# Patient Record
Sex: FEMALE | URBAN - METROPOLITAN AREA
[De-identification: names, ages, dates, MRNs, and addresses within clinical notes are randomized per-mention and may not be internally consistent; named-entity substitution may affect disease eponyms.]

---

## 2017-09-05 ENCOUNTER — APPOINTMENT (RX ONLY)
Dept: URBAN - METROPOLITAN AREA CLINIC 15 | Facility: CLINIC | Age: 57
Setting detail: DERMATOLOGY
End: 2017-09-05

## 2017-09-05 DIAGNOSIS — Z41.9 ENCOUNTER FOR PROCEDURE FOR PURPOSES OTHER THAN REMEDYING HEALTH STATE, UNSPECIFIED: ICD-10-CM

## 2017-09-05 PROBLEM — E78.5 HYPERLIPIDEMIA, UNSPECIFIED: Status: ACTIVE | Noted: 2017-09-05

## 2017-09-05 PROBLEM — E05.90 THYROTOXICOSIS, UNSPECIFIED WITHOUT THYROTOXIC CRISIS OR STORM: Status: ACTIVE | Noted: 2017-09-05

## 2017-09-05 PROCEDURE — ? ADDITIONAL NOTES

## 2017-09-05 NOTE — PROCEDURE: ADDITIONAL NOTES
Additional Notes: QUOTES\\n\\n1- Scar on the left cheek\\nPlan: ICON XD \\n\\n2- Rosacea\\nPlan: IPL green (4 sessions every 4-6 weeks) $350 each session \\nSPF 50 every day  \\n\\n3- Milia on the face\\nPlan: Facial extraction ( discussed with the patient, many of them will not respond because will be calcification)\\nTreatment: Smoothing Retinol 2x ( change to 5 x in 2-3 months)\\n\\n4- Melasma \\nTreatment: advance skin brigthening 6% every other night for 2 weeks and then every night x 3 months\\n\\n5- Poikiloderma on the neck\\nPlan: IPL on the neck\\n\\n6- Fillers plan:\\n1 voluma $1000 the second one $900\\n1 Radiesse $750\\n1 juvederm ultra plus $700\\n1 juvederm ultra $600\\n\\n7- Botox on the perioral area $200 and Fraxel laser on the perioral area $400 or Fraxel full face $1000 \\n\\n8- Gentle max pro for the lentigies on the hands $300

## 2017-10-17 ENCOUNTER — APPOINTMENT (RX ONLY)
Dept: URBAN - METROPOLITAN AREA CLINIC 15 | Facility: CLINIC | Age: 57
Setting detail: DERMATOLOGY
End: 2017-10-17

## 2017-10-17 DIAGNOSIS — Z41.9 ENCOUNTER FOR PROCEDURE FOR PURPOSES OTHER THAN REMEDYING HEALTH STATE, UNSPECIFIED: ICD-10-CM

## 2017-10-17 PROCEDURE — ? IPL COSMETIC

## 2017-10-17 ASSESSMENT — LOCATION DETAILED DESCRIPTION DERM
LOCATION DETAILED: NASAL DORSUM
LOCATION DETAILED: RIGHT MEDIAL ZYGOMA
LOCATION DETAILED: LEFT FOREHEAD
LOCATION DETAILED: RIGHT INFERIOR CENTRAL MALAR CHEEK
LOCATION DETAILED: RIGHT INFERIOR MEDIAL MALAR CHEEK
LOCATION DETAILED: LEFT SUPERIOR CENTRAL MALAR CHEEK
LOCATION DETAILED: INFERIOR MID FOREHEAD
LOCATION DETAILED: LEFT CENTRAL MALAR CHEEK
LOCATION DETAILED: RIGHT CENTRAL BUCCAL CHEEK
LOCATION DETAILED: RIGHT INFERIOR CENTRAL BUCCAL CHEEK
LOCATION DETAILED: LEFT INFERIOR CENTRAL MALAR CHEEK
LOCATION DETAILED: RIGHT MENTAL CREASE
LOCATION DETAILED: LEFT CENTRAL BUCCAL CHEEK
LOCATION DETAILED: LEFT INFERIOR MEDIAL MALAR CHEEK
LOCATION DETAILED: RIGHT CENTRAL MALAR CHEEK

## 2017-10-17 ASSESSMENT — LOCATION ZONE DERM
LOCATION ZONE: NOSE
LOCATION ZONE: FACE

## 2017-10-17 ASSESSMENT — LOCATION SIMPLE DESCRIPTION DERM
LOCATION SIMPLE: LEFT FOREHEAD
LOCATION SIMPLE: RIGHT CHEEK
LOCATION SIMPLE: RIGHT ZYGOMA
LOCATION SIMPLE: CHIN
LOCATION SIMPLE: INFERIOR FOREHEAD
LOCATION SIMPLE: LEFT CHEEK
LOCATION SIMPLE: NOSE

## 2017-10-17 NOTE — PROCEDURE: IPL COSMETIC
Ipl Type: Yung [place]
Treatment Number: 1
Beam Overlap/Density (Include Units): 20%
Detail Level: Zone
Pulse Energy (Include Units): 20/32 then 10/32
Indication: Rosacea
Total Pulses: 92
Coolin
Number Of Passes: 2
Price (Use Numbers Only, No Special Characters Or $): 350.00
Eye Protection: Laser Aid
Post-Care Instructions: I reviewed with the patient in detail post-care instructions. Patient should stay away from the sun and wear sun protection until treated areas are fully healed.
Pre-Procedure Care: Prior to the procedure the patient and all present had protective eyewear in place and a warning sign was placed on the door.
End-Point And Post-Procedure Care: The procedure continued until mild purpura was noted. Immediately following the procedure, Vaseline and ice applied. Post care reviewed with patient.
Consent: Prior to the procedure consent obtained, risks reviewed including but not limited to crusting, scabbing, blistering, scarring, darker or lighter pigmentary change, incidental hair removal, bruising, and/or incomplete removal.

## 2024-08-05 NOTE — HPI: OTHER
[Time Spent: ___ minutes] : I have spent [unfilled] minutes of time on the encounter.
[Time Spent: ___ minutes] : I have spent [unfilled] minutes of time on the encounter.
Condition:: Facial Erythema
Please Describe Your Condition:: redness on face with vessels.  IPL Recommended for Dr. Gonzalez Cornea $350.00